# Patient Record
Sex: FEMALE | NOT HISPANIC OR LATINO | URBAN - METROPOLITAN AREA
[De-identification: names, ages, dates, MRNs, and addresses within clinical notes are randomized per-mention and may not be internally consistent; named-entity substitution may affect disease eponyms.]

---

## 2020-01-20 ENCOUNTER — Encounter (OUTPATIENT)
Dept: URBAN - METROPOLITAN AREA CLINIC 71 | Facility: CLINIC | Age: 80
End: 2020-01-20
Payer: MEDICARE

## 2020-01-20 PROCEDURE — 92134 CPTRZ OPH DX IMG PST SGM RTA: CPT | Performed by: OPTOMETRIST

## 2020-01-20 PROCEDURE — 92014 COMPRE OPH EXAM EST PT 1/>: CPT | Performed by: OPTOMETRIST

## 2020-09-21 ENCOUNTER — OFFICE VISIT (OUTPATIENT)
Dept: URBAN - METROPOLITAN AREA CLINIC 71 | Facility: CLINIC | Age: 80
End: 2020-09-21

## 2020-09-21 DIAGNOSIS — H52.4 PRESBYOPIA: Primary | ICD-10-CM

## 2020-09-21 PROCEDURE — 92015 DETERMINE REFRACTIVE STATE: CPT | Performed by: OPTOMETRIST

## 2020-09-21 PROCEDURE — V2799 MISC VISION ITEM OR SERVICE: HCPCS | Performed by: OPTOMETRIST

## 2020-09-21 ASSESSMENT — KERATOMETRY
OD: 43.75
OS: 44.13

## 2020-09-21 ASSESSMENT — VISUAL ACUITY
OD: 20/20
OS: 20/20

## 2020-09-21 ASSESSMENT — INTRAOCULAR PRESSURE
OS: 15
OD: 14

## 2021-03-30 ENCOUNTER — OFFICE VISIT (OUTPATIENT)
Dept: URBAN - METROPOLITAN AREA CLINIC 71 | Facility: CLINIC | Age: 81
End: 2021-03-30
Payer: MEDICARE

## 2021-03-30 DIAGNOSIS — H16.142 PUNCTATE KERATITIS, LEFT EYE: Primary | ICD-10-CM

## 2021-03-30 PROCEDURE — 99212 OFFICE O/P EST SF 10 MIN: CPT | Performed by: OPTOMETRIST

## 2021-03-30 RX ORDER — NEOMYCIN SULFATE, POLYMYXIN B SULFATE AND DEXAMETHASONE 3.5; 10000; 1 MG/ML; [USP'U]/ML; MG/ML
SUSPENSION OPHTHALMIC
Qty: 5 | Refills: 0 | Status: INACTIVE
Start: 2021-03-30 | End: 2021-10-05

## 2021-03-30 ASSESSMENT — INTRAOCULAR PRESSURE
OD: 15
OS: 17

## 2021-03-30 NOTE — IMPRESSION/PLAN
Impression: Dry eye syndrome of bilateral lacrimal glands: H04.123. Discussed the importance of staying on a consistent regiment with the artificial tears. If she gets feeling better and stops the GTTS the irritation and sensation that something is in the eye will return. sample of systane complete given to patient. Plan: Dry eye syndrome requires lubrication of the eye with artificial tears and nighttime ointments or gels. In addition, topical and oral anti-inflammatory medications are usually necessary to treat the associated ocular irritation. Contact the office if dry eye does not improve, worsens or blurs vision. Recommend 3-4 drops daily of Systane Balance and ointment at bedtime.

## 2021-03-30 NOTE — IMPRESSION/PLAN
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 Recommend starting Maxitrol TID OS x1 week. Plan: SPK is multiple rough/dry spots of the corneal surface epithelium and it has many possible causes. SPK is frequently caused by dry eye syndrome, abnormal eyelid position, inability to close the eyelids properly, eyelashes growing in the wrong direction, conjunctivitis, and blepharitis. Treating the underlying condition will usually allow the heal itself and the punctate keratopathy will resolve. SPK will often cause ocular pain or irritation, redness, tearing, and blurry or fluctuating vision. SPK will usually respond to treatment with lubrication with artificial tears and ointments or gels. More severe forms may require treatment with topical steroid drops. Infections such as bacterial and viral conjunctivitis and blepharitis can cause secondary SPK and treatment of the infection will cure the problem. Contact the office if ocular irritation , redness, tearing, and blurry vision fail to respond to treatment.

## 2021-10-05 ENCOUNTER — OFFICE VISIT (OUTPATIENT)
Dept: URBAN - METROPOLITAN AREA CLINIC 75 | Facility: CLINIC | Age: 81
End: 2021-10-05
Payer: MEDICARE

## 2021-10-05 DIAGNOSIS — Z96.1 PRESENCE OF INTRAOCULAR LENS: ICD-10-CM

## 2021-10-05 DIAGNOSIS — H35.372 PUCKERING OF MACULA, LEFT EYE: Primary | ICD-10-CM

## 2021-10-05 PROCEDURE — 92014 COMPRE OPH EXAM EST PT 1/>: CPT | Performed by: OPTOMETRIST

## 2021-10-05 ASSESSMENT — INTRAOCULAR PRESSURE
OS: 15
OD: 13

## 2021-10-05 NOTE — IMPRESSION/PLAN
Impression: Vitreous degeneration, bilateral: H43.813-The PVD is stable, and there is no evidence of a retinal tear or detachment on dilated exam. Plan:  Reviewed the signs and symptoms of a retinal tear and detachment in detail with the patient, including worsening flashes, new floaters, and development of a shadow/curtain in the peripheral visual field. The patient was advised to call immediately with any changes to [address] or increase in symptoms.

## 2021-10-05 NOTE — IMPRESSION/PLAN
Impression: Puckering of macula, left eye: H35.372- NEW Dx. 
 Plan: Epiretinal membranes do not usually require treatment, unless distorted vision or blurry vision occur. Contact office if you experience a loss of vision or distortion.

## 2022-03-07 ENCOUNTER — OFFICE VISIT (OUTPATIENT)
Dept: URBAN - METROPOLITAN AREA CLINIC 75 | Facility: CLINIC | Age: 82
End: 2022-03-07
Payer: MEDICARE

## 2022-03-07 DIAGNOSIS — H04.123 DRY EYE SYNDROME OF BILATERAL LACRIMAL GLANDS: Primary | ICD-10-CM

## 2022-03-07 PROCEDURE — 99213 OFFICE O/P EST LOW 20 MIN: CPT | Performed by: OPTOMETRIST

## 2022-03-07 ASSESSMENT — INTRAOCULAR PRESSURE
OD: 16
OS: 16

## 2022-03-07 NOTE — IMPRESSION/PLAN
Impression: Dry eye syndrome of bilateral lacrimal glands: H04.123. Plan: Dry eye syndrome requires lubrication of the eye with artificial tears and nighttime ointments or gels. In addition, topical and oral anti-inflammatory medications are usually necessary to treat the associated ocular irritation. Contact office if dry eye does not improve, worsens or blurs vision. Recommend 3-4 drops daily of Systane Balance and ointment at bedtime. FB sensation is likely due to dryness and scratchy feeling of the cornea.

## 2022-03-07 NOTE — IMPRESSION/PLAN
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  Plan: USE AT as talked about in plan 1

## 2022-04-06 ENCOUNTER — OFFICE VISIT (OUTPATIENT)
Dept: URBAN - METROPOLITAN AREA CLINIC 75 | Facility: CLINIC | Age: 82
End: 2022-04-06
Payer: MEDICARE

## 2022-04-06 DIAGNOSIS — H35.373 PUCKERING OF MACULA, BILATERAL: Primary | ICD-10-CM

## 2022-04-06 DIAGNOSIS — H43.813 VITREOUS DEGENERATION, BILATERAL: ICD-10-CM

## 2022-04-06 DIAGNOSIS — H04.213 BILATERAL EPIPHORA DUE TO EXCESS LACRIMATION OF LACRIMAL GLANDS: ICD-10-CM

## 2022-04-06 PROCEDURE — 99214 OFFICE O/P EST MOD 30 MIN: CPT | Performed by: OPTOMETRIST

## 2022-04-06 PROCEDURE — 92134 CPTRZ OPH DX IMG PST SGM RTA: CPT | Performed by: OPTOMETRIST

## 2022-04-06 PROCEDURE — 92133 CPTRZD OPH DX IMG PST SGM ON: CPT | Performed by: OPTOMETRIST

## 2022-04-06 ASSESSMENT — INTRAOCULAR PRESSURE
OS: 13
OD: 14

## 2022-04-06 NOTE — IMPRESSION/PLAN
Impression: Puckering of macula, bilateral: H35.373. OCT ordered and performed Stable OD < OS No tears or holes at this time. Plan: Epiretinal membranes do not usually require treatment, unless distorted vision or blurry vision occur. The main treatment consists of vitrectomy surgery with peeling off the ERM. No treatment recommended at this time. A diet rich in green vegetables such as spinach and kale is recommended. Monitor with OCT. Contact office if you experience a loss of vision or distortion.

## 2022-04-06 NOTE — IMPRESSION/PLAN
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 Plan: Pt recommended to continue use of OTC lubrication and consider possible massage therapy for relief.

## 2022-10-31 ENCOUNTER — OFFICE VISIT (OUTPATIENT)
Dept: URBAN - METROPOLITAN AREA CLINIC 71 | Facility: CLINIC | Age: 82
End: 2022-10-31
Payer: COMMERCIAL

## 2022-10-31 DIAGNOSIS — H35.373 PUCKERING OF MACULA, BILATERAL: ICD-10-CM

## 2022-10-31 DIAGNOSIS — H52.4 PRESBYOPIA: Primary | ICD-10-CM

## 2022-10-31 DIAGNOSIS — H52.03 HYPERMETROPIA, BILATERAL: ICD-10-CM

## 2022-10-31 DIAGNOSIS — Z96.1 PRESENCE OF INTRAOCULAR LENS: ICD-10-CM

## 2022-10-31 PROCEDURE — 92014 COMPRE OPH EXAM EST PT 1/>: CPT

## 2022-10-31 ASSESSMENT — INTRAOCULAR PRESSURE
OS: 15
OD: 15

## 2022-10-31 ASSESSMENT — VISUAL ACUITY
OD: 20/20
OS: 20/20

## 2022-10-31 NOTE — IMPRESSION/PLAN
Impression: Presbyopia: H52.4. Plan: Issued new spec Rx, discussed changes and adaptation period. Educated on options of PAL/MF.  

RTC 1yr or prn

## 2022-10-31 NOTE — IMPRESSION/PLAN
Impression: Puckering of macula, bilateral: H35.373. Plan: Discussed condition with patient and associated visual symptoms. RTC in 6mo for DFE/Mac OCT.

## 2023-04-07 ENCOUNTER — OFFICE VISIT (OUTPATIENT)
Dept: URBAN - METROPOLITAN AREA CLINIC 75 | Facility: CLINIC | Age: 83
End: 2023-04-07
Payer: MEDICARE

## 2023-04-07 DIAGNOSIS — Z96.1 PRESENCE OF INTRAOCULAR LENS: ICD-10-CM

## 2023-04-07 DIAGNOSIS — H35.373 PUCKERING OF MACULA, BILATERAL: Primary | ICD-10-CM

## 2023-04-07 DIAGNOSIS — H35.369 DRUSEN OF MACULA: ICD-10-CM

## 2023-04-07 DIAGNOSIS — H43.813 VITREOUS DEGENERATION, BILATERAL: ICD-10-CM

## 2023-04-07 PROCEDURE — 99213 OFFICE O/P EST LOW 20 MIN: CPT | Performed by: OPTOMETRIST

## 2023-04-07 PROCEDURE — 92134 CPTRZ OPH DX IMG PST SGM RTA: CPT | Performed by: OPTOMETRIST

## 2023-04-07 ASSESSMENT — INTRAOCULAR PRESSURE
OD: 13
OS: 16

## 2023-04-07 NOTE — IMPRESSION/PLAN
Impression: Drusen of macula: H35.369. Left. 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 A diet rich in green vegetables and/or eye vitamins is recommended. The drusen may increase with time and to try to decrease progression the recommendations above should be followed. If drusen progress they can cause decrease in vision or distortion of vision. Drusen may be an early sign of macular degeneration and this diagnosis may be possible in future. Avoidance of smoking or smoking cessation is recommended. Contact office if you experience decrease in vision, blurred vision, distortion in vision or concerning changes in vision.

## 2023-04-07 NOTE — IMPRESSION/PLAN
Impression: Puckering of macula, bilateral: H35.373. OCT ordered and performed - Mild OD - Traction creating distortion w/Drusen present OS Plan: Epiretinal membranes do not usually require treatment, unless distorted vision or blurry vision occur. The main treatment consists of vitrectomy surgery with peeling off the ERM. No treatment recommended at this time. A diet rich in green vegetables such as spinach and kale is recommended. Monitor with OCT. Contact office if you experience a loss of vision or symptomatic distortion.

## 2023-11-02 ENCOUNTER — OFFICE VISIT (OUTPATIENT)
Dept: URBAN - METROPOLITAN AREA CLINIC 75 | Facility: CLINIC | Age: 83
End: 2023-11-02
Payer: COMMERCIAL

## 2023-11-02 DIAGNOSIS — H52.13 MYOPIA, BILATERAL: Primary | ICD-10-CM

## 2023-11-02 PROCEDURE — 92014 COMPRE OPH EXAM EST PT 1/>: CPT | Performed by: OPTOMETRIST

## 2023-11-02 ASSESSMENT — VISUAL ACUITY
OD: 20/20
OS: 20/20

## 2023-11-02 ASSESSMENT — INTRAOCULAR PRESSURE
OD: 15
OS: 14

## 2024-04-04 ENCOUNTER — OFFICE VISIT (OUTPATIENT)
Dept: URBAN - METROPOLITAN AREA CLINIC 75 | Facility: CLINIC | Age: 84
End: 2024-04-04
Payer: MEDICARE

## 2024-04-04 DIAGNOSIS — H43.813 VITREOUS DEGENERATION, BILATERAL: ICD-10-CM

## 2024-04-04 DIAGNOSIS — H35.373 PUCKERING OF MACULA, BILATERAL: Primary | ICD-10-CM

## 2024-04-04 DIAGNOSIS — Z79.899 OTHER LONG TERM (CURRENT) DRUG THERAPY: ICD-10-CM

## 2024-04-04 PROCEDURE — 99214 OFFICE O/P EST MOD 30 MIN: CPT | Performed by: OPTOMETRIST

## 2024-04-04 PROCEDURE — 92134 CPTRZ OPH DX IMG PST SGM RTA: CPT | Performed by: OPTOMETRIST

## 2024-04-04 ASSESSMENT — INTRAOCULAR PRESSURE
OD: 10
OS: 14

## 2024-10-03 ENCOUNTER — OFFICE VISIT (OUTPATIENT)
Dept: URBAN - METROPOLITAN AREA CLINIC 75 | Facility: CLINIC | Age: 84
End: 2024-10-03
Payer: MEDICARE

## 2024-10-03 DIAGNOSIS — M05.80: ICD-10-CM

## 2024-10-03 DIAGNOSIS — H35.373 PUCKERING OF MACULA, BILATERAL: Primary | ICD-10-CM

## 2024-10-03 DIAGNOSIS — H43.813 VITREOUS DEGENERATION, BILATERAL: ICD-10-CM

## 2024-10-03 PROCEDURE — 99214 OFFICE O/P EST MOD 30 MIN: CPT | Performed by: OPTOMETRIST

## 2024-10-03 PROCEDURE — 92134 CPTRZ OPH DX IMG PST SGM RTA: CPT | Performed by: OPTOMETRIST

## 2024-10-03 ASSESSMENT — INTRAOCULAR PRESSURE
OS: 12
OD: 11

## 2025-04-03 ENCOUNTER — OFFICE VISIT (OUTPATIENT)
Dept: URBAN - METROPOLITAN AREA CLINIC 75 | Facility: CLINIC | Age: 85
End: 2025-04-03
Payer: MEDICARE

## 2025-04-03 DIAGNOSIS — H35.033 HYPERTENSIVE RETINOPATHY, BILATERAL: ICD-10-CM

## 2025-04-03 DIAGNOSIS — H35.373 PUCKERING OF MACULA, BILATERAL: Primary | ICD-10-CM

## 2025-04-03 DIAGNOSIS — H43.813 VITREOUS DEGENERATION, BILATERAL: ICD-10-CM

## 2025-04-03 PROCEDURE — 92134 CPTRZ OPH DX IMG PST SGM RTA: CPT | Performed by: OPTOMETRIST

## 2025-04-03 PROCEDURE — 99214 OFFICE O/P EST MOD 30 MIN: CPT | Performed by: OPTOMETRIST

## 2025-04-03 PROCEDURE — 92133 CPTRZD OPH DX IMG PST SGM ON: CPT | Performed by: OPTOMETRIST

## 2025-04-03 ASSESSMENT — INTRAOCULAR PRESSURE
OD: 17
OS: 17